# Patient Record
Sex: MALE | Race: BLACK OR AFRICAN AMERICAN | NOT HISPANIC OR LATINO | Employment: FULL TIME | URBAN - METROPOLITAN AREA
[De-identification: names, ages, dates, MRNs, and addresses within clinical notes are randomized per-mention and may not be internally consistent; named-entity substitution may affect disease eponyms.]

---

## 2019-09-04 ENCOUNTER — HOSPITAL ENCOUNTER (EMERGENCY)
Facility: HOSPITAL | Age: 34
Discharge: HOME | End: 2019-09-04
Attending: EMERGENCY MEDICINE
Payer: MEDICAID

## 2019-09-04 VITALS
WEIGHT: 184.06 LBS | DIASTOLIC BLOOD PRESSURE: 65 MMHG | BODY MASS INDEX: 28.89 KG/M2 | RESPIRATION RATE: 16 BRPM | HEIGHT: 67 IN | TEMPERATURE: 98 F | HEART RATE: 68 BPM | OXYGEN SATURATION: 99 % | SYSTOLIC BLOOD PRESSURE: 146 MMHG

## 2019-09-04 DIAGNOSIS — S20.212A CONTUSION OF RIB ON LEFT SIDE, INITIAL ENCOUNTER: Primary | ICD-10-CM
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
## 2019-09-04 PROCEDURE — 99284 EMERGENCY DEPT VISIT MOD MDM: CPT | Mod: 25

## 2019-09-04 RX ORDER — IBUPROFEN 800 MG/1
800 TABLET ORAL EVERY 6 HOURS PRN
Qty: 20 TABLET | Refills: 0 | Status: SHIPPED | OUTPATIENT
Start: 2019-09-04

## 2019-09-04 RX ORDER — TRAMADOL HYDROCHLORIDE 50 MG/1
50 TABLET ORAL EVERY 6 HOURS PRN
Qty: 12 TABLET | Refills: 0 | Status: SHIPPED | OUTPATIENT
Start: 2019-09-04

## 2019-09-04 NOTE — ED PROVIDER NOTES
Encounter Date: 9/4/2019       History     Chief Complaint   Patient presents with    Fall     Patient states he slipped and fell and hit his left rib cage 2 weeks ago, states pain now radiates around back to other side of rib cage.      The history is provided by the patient.   Fall   The accident occurred several days ago. Fall occurred: crawling in an attic  Pertinent negatives include no back pain, no fever and no nausea.   Pt reports falling on his ribs 2 weeks ago. Denies any CP, SOB, cough or any other symptoms at this time.     Review of patient's allergies indicates:  No Known Allergies  No past medical history on file.  No past surgical history on file.  No family history on file.  Social History     Tobacco Use    Smoking status: Not on file   Substance Use Topics    Alcohol use: Not on file    Drug use: Not on file     Review of Systems   Constitutional: Negative for fever.   HENT: Negative for sore throat.    Respiratory: Negative for shortness of breath.    Cardiovascular: Negative for chest pain.   Gastrointestinal: Negative for nausea.   Genitourinary: Negative for dysuria.   Musculoskeletal: Negative for back pain.        + L rib pain     Skin: Negative for rash.   Neurological: Negative for weakness.   Hematological: Does not bruise/bleed easily.   All other systems reviewed and are negative.      Physical Exam     Initial Vitals [09/04/19 1119]   BP Pulse Resp Temp SpO2   (!) 146/65 68 16 97.6 °F (36.4 °C) 99 %      MAP       --         Physical Exam    Constitutional: He appears well-developed and well-nourished. No distress.   HENT:   Head: Normocephalic and atraumatic.   Nose: Nose normal.   Mouth/Throat: Oropharynx is clear and moist.   Eyes: Conjunctivae and EOM are normal. Pupils are equal, round, and reactive to light.   Neck: Normal range of motion. Neck supple.   Cardiovascular: Normal rate and regular rhythm.   Pulmonary/Chest: Effort normal and breath sounds normal. No respiratory  distress. He has no decreased breath sounds. He has no wheezes. He has no rales. He exhibits tenderness.       Abdominal: Soft. Normal appearance and bowel sounds are normal. There is no tenderness.   Musculoskeletal: Normal range of motion.   Neurological: He is alert and oriented to person, place, and time. He has normal strength. GCS eye subscore is 4. GCS verbal subscore is 5. GCS motor subscore is 6.   Skin: Skin is warm and dry. Capillary refill takes less than 2 seconds. No rash noted.   Psychiatric: He has a normal mood and affect. His speech is normal and behavior is normal.         ED Course   Procedures  Labs Reviewed - No data to display       Imaging Results          X-Ray Chest 1 View (Final result)  Result time 09/04/19 11:54:47    Final result by Adria House MD (Timothy) (09/04/19 11:54:47)                 Impression:      Situs inversus with dextrocardia.  Clear lungs.      Electronically signed by: Adria House MD  Date:    09/04/2019  Time:    11:54             Narrative:    EXAMINATION:  XR CHEST 1 VIEW    CLINICAL HISTORY:  <Diagnosis>,    COMPARISON:  None    FINDINGS:  Dextrocardia is present.  The heart is normal in size.  The lungs appear clear.  No pneumothorax.  The liver is present on the left.                               X-Ray Ribs 2 View Left (Final result)  Result time 09/04/19 11:54:00    Final result by Adria House MD (Timothy) (09/04/19 11:54:00)                 Impression:      No rib fractures.      Electronically signed by: Adria House MD  Date:    09/04/2019  Time:    11:54             Narrative:    EXAMINATION:  XR RIBS 2 VIEW LEFT    CLINICAL HISTORY:  Unspecified fall, initial encounter, <Reason For Exam>    TECHNIQUE:  Standard rib series.    COMPARISON:  Chest x-ray 09/04/2019    FINDINGS:  The rib views are negative.  No acute findings.  Dextrocardia with situs inversus suspected.                                                      Clinical Impression:        ICD-10-CM ICD-9-CM   1. Contusion of rib on left side, initial encounter S20.212A 922.1   2. Fall W19.XXXA E888.9                                Beau Blood Jr., [facility]  09/04/19 1213